# Patient Record
Sex: FEMALE | Race: OTHER | HISPANIC OR LATINO
[De-identification: names, ages, dates, MRNs, and addresses within clinical notes are randomized per-mention and may not be internally consistent; named-entity substitution may affect disease eponyms.]

---

## 2019-10-04 VITALS
SYSTOLIC BLOOD PRESSURE: 127 MMHG | TEMPERATURE: 98 F | RESPIRATION RATE: 16 BRPM | WEIGHT: 161.6 LBS | HEIGHT: 67 IN | OXYGEN SATURATION: 97 % | HEART RATE: 81 BPM | DIASTOLIC BLOOD PRESSURE: 82 MMHG

## 2019-10-07 ENCOUNTER — RESULT REVIEW (OUTPATIENT)
Age: 34
End: 2019-10-07

## 2019-10-07 ENCOUNTER — OUTPATIENT (OUTPATIENT)
Dept: OUTPATIENT SERVICES | Facility: HOSPITAL | Age: 34
LOS: 1 days | Discharge: HOME | End: 2019-10-07
Payer: COMMERCIAL

## 2019-10-07 VITALS — TEMPERATURE: 98 F

## 2019-10-07 DIAGNOSIS — Z98.890 OTHER SPECIFIED POSTPROCEDURAL STATES: Chronic | ICD-10-CM

## 2019-10-07 LAB
GRAM STN FLD: SIGNIFICANT CHANGE UP
SPECIMEN SOURCE: SIGNIFICANT CHANGE UP

## 2019-10-07 PROCEDURE — 88305 TISSUE EXAM BY PATHOLOGIST: CPT

## 2019-10-07 PROCEDURE — 53230 REMOVAL OF URETHRA LESION: CPT

## 2019-10-07 PROCEDURE — 87075 CULTR BACTERIA EXCEPT BLOOD: CPT

## 2019-10-07 PROCEDURE — 87070 CULTURE OTHR SPECIMN AEROBIC: CPT

## 2019-10-07 RX ORDER — OXYBUTYNIN CHLORIDE 5 MG
5 TABLET ORAL ONCE
Refills: 0 | Status: DISCONTINUED | OUTPATIENT
Start: 2019-10-07 | End: 2019-10-07

## 2019-10-07 RX ORDER — SODIUM CHLORIDE 9 MG/ML
1000 INJECTION, SOLUTION INTRAVENOUS
Refills: 0 | Status: DISCONTINUED | OUTPATIENT
Start: 2019-10-07 | End: 2019-10-07

## 2019-10-07 RX ORDER — DOCUSATE SODIUM 100 MG
1 CAPSULE ORAL
Qty: 60 | Refills: 0
Start: 2019-10-07

## 2019-10-07 RX ORDER — ONDANSETRON 8 MG/1
4 TABLET, FILM COATED ORAL EVERY 6 HOURS
Refills: 0 | Status: DISCONTINUED | OUTPATIENT
Start: 2019-10-07 | End: 2019-10-07

## 2019-10-07 RX ORDER — OXYCODONE AND ACETAMINOPHEN 5; 325 MG/1; MG/1
2 TABLET ORAL EVERY 6 HOURS
Refills: 0 | Status: DISCONTINUED | OUTPATIENT
Start: 2019-10-07 | End: 2019-10-07

## 2019-10-07 RX ORDER — OXYCODONE AND ACETAMINOPHEN 5; 325 MG/1; MG/1
1 TABLET ORAL EVERY 4 HOURS
Refills: 0 | Status: DISCONTINUED | OUTPATIENT
Start: 2019-10-07 | End: 2019-10-07

## 2019-10-07 RX ORDER — HYDROMORPHONE HYDROCHLORIDE 2 MG/ML
0.5 INJECTION INTRAMUSCULAR; INTRAVENOUS; SUBCUTANEOUS
Refills: 0 | Status: DISCONTINUED | OUTPATIENT
Start: 2019-10-07 | End: 2019-10-07

## 2019-10-07 RX ORDER — METOCLOPRAMIDE HCL 10 MG
10 TABLET ORAL ONCE
Refills: 0 | Status: DISCONTINUED | OUTPATIENT
Start: 2019-10-07 | End: 2019-10-07

## 2019-10-07 RX ORDER — CEPHALEXIN 500 MG
1 CAPSULE ORAL
Qty: 12 | Refills: 0
Start: 2019-10-07 | End: 2019-10-12

## 2019-10-07 RX ORDER — SCOPALAMINE 1 MG/3D
1 PATCH, EXTENDED RELEASE TRANSDERMAL ONCE
Refills: 0 | Status: COMPLETED | OUTPATIENT
Start: 2019-10-07 | End: 2019-10-07

## 2019-10-07 RX ADMIN — SCOPALAMINE 1 PATCH: 1 PATCH, EXTENDED RELEASE TRANSDERMAL at 10:28

## 2019-10-10 LAB — SURGICAL PATHOLOGY STUDY: SIGNIFICANT CHANGE UP

## 2019-10-12 LAB
CULTURE RESULTS: NO GROWTH — SIGNIFICANT CHANGE UP
SPECIMEN SOURCE: SIGNIFICANT CHANGE UP

## 2019-10-21 ENCOUNTER — APPOINTMENT (OUTPATIENT)
Dept: RADIOLOGY | Facility: HOSPITAL | Age: 34
End: 2019-10-21
Payer: COMMERCIAL

## 2019-10-21 ENCOUNTER — OUTPATIENT (OUTPATIENT)
Dept: OUTPATIENT SERVICES | Facility: HOSPITAL | Age: 34
LOS: 1 days | End: 2019-10-21
Payer: COMMERCIAL

## 2019-10-21 DIAGNOSIS — Z98.890 OTHER SPECIFIED POSTPROCEDURAL STATES: Chronic | ICD-10-CM

## 2019-10-21 PROCEDURE — 74455 X-RAY URETHRA/BLADDER: CPT | Mod: 26

## 2019-10-21 PROCEDURE — 74455 X-RAY URETHRA/BLADDER: CPT

## 2019-10-21 PROCEDURE — 51600 INJECTION FOR BLADDER X-RAY: CPT

## 2019-11-07 ENCOUNTER — RX RENEWAL (OUTPATIENT)
Age: 34
End: 2019-11-07

## 2019-11-07 DIAGNOSIS — Z87.440 PERSONAL HISTORY OF URINARY (TRACT) INFECTIONS: ICD-10-CM

## 2020-01-16 ENCOUNTER — RECORD ABSTRACTING (OUTPATIENT)
Age: 35
End: 2020-01-16

## 2020-01-16 DIAGNOSIS — Z78.9 OTHER SPECIFIED HEALTH STATUS: ICD-10-CM

## 2020-01-16 DIAGNOSIS — Z87.19 PERSONAL HISTORY OF OTHER DISEASES OF THE DIGESTIVE SYSTEM: ICD-10-CM

## 2020-01-16 DIAGNOSIS — N28.89 OTHER SPECIFIED DISORDERS OF KIDNEY AND URETER: ICD-10-CM

## 2020-01-16 DIAGNOSIS — N36.8 OTHER SPECIFIED DISORDERS OF URETHRA: ICD-10-CM

## 2020-01-29 ENCOUNTER — APPOINTMENT (OUTPATIENT)
Dept: UROLOGY | Facility: CLINIC | Age: 35
End: 2020-01-29
Payer: COMMERCIAL

## 2020-01-29 LAB
BILIRUB UR QL STRIP: NORMAL
CLARITY UR: CLEAR
COLLECTION METHOD: NORMAL
GLUCOSE UR-MCNC: NORMAL
HCG UR QL: NORMAL EU/DL
HGB UR QL STRIP.AUTO: NORMAL
KETONES UR-MCNC: NORMAL
LEUKOCYTE ESTERASE UR QL STRIP: NORMAL
NITRITE UR QL STRIP: NORMAL
PH UR STRIP: 7
PROT UR STRIP-MCNC: NORMAL
SP GR UR STRIP: 1.02

## 2020-01-29 PROCEDURE — 99214 OFFICE O/P EST MOD 30 MIN: CPT

## 2020-01-29 PROCEDURE — 81003 URINALYSIS AUTO W/O SCOPE: CPT | Mod: QW

## 2020-01-29 NOTE — HISTORY OF PRESENT ILLNESS
[FreeTextEntry1] : Pt is a 34 year old  s/p urethral diverticulectomy on 10/7/19.  She presents today complaining of "feeling a stitch" in the vagina.  She also has been experiencing lower back and suprapubic pressure for 2 weeks.

## 2020-01-29 NOTE — PHYSICAL EXAM
[General Appearance - Well Developed] : well developed [General Appearance - Well Nourished] : well nourished [Normal Appearance] : normal appearance [Well Groomed] : well groomed [General Appearance - In No Acute Distress] : no acute distress [Abdomen Soft] : soft [Abdomen Tenderness] : non-tender [Costovertebral Angle Tenderness] : no ~M costovertebral angle tenderness [Urethral Meatus] : normal urethra [FreeTextEntry1] : left sub-urethral suture knot with surrounding irritiation- cut at level of skin [Edema] : no peripheral edema [] : no respiratory distress [Respiration, Rhythm And Depth] : normal respiratory rhythm and effort [Exaggerated Use Of Accessory Muscles For Inspiration] : no accessory muscle use [Oriented To Time, Place, And Person] : oriented to person, place, and time [Affect] : the affect was normal [Mood] : the mood was normal [Not Anxious] : not anxious [Normal Station and Gait] : the gait and station were normal for the patient's age [No Focal Deficits] : no focal deficits [No Palpable Adenopathy] : no palpable adenopathy

## 2020-01-29 NOTE — ASSESSMENT
[FreeTextEntry1] : 33 yo female s/p urethral diverticulectomy with a small non-absorbed knot of a vicryl suture- cut in the office which was well -tolerated.  Once removed, vaginal periurethral tissue is soft and no foreign body is palpated.   Surrounding irritation should heal now that nidus removed.  If no better in 2 weeks, I would like to see patient in office for a repeat vaginal exam.

## 2020-01-31 LAB — BACTERIA UR CULT: NORMAL

## 2020-05-14 PROBLEM — K21.9 GASTRO-ESOPHAGEAL REFLUX DISEASE WITHOUT ESOPHAGITIS: Chronic | Status: ACTIVE | Noted: 2019-10-04

## 2020-05-14 PROBLEM — N36.1 URETHRAL DIVERTICULUM: Chronic | Status: ACTIVE | Noted: 2019-10-04

## 2020-05-20 ENCOUNTER — APPOINTMENT (OUTPATIENT)
Dept: UROLOGY | Facility: CLINIC | Age: 35
End: 2020-05-20
Payer: COMMERCIAL

## 2020-05-20 VITALS
TEMPERATURE: 97.8 F | WEIGHT: 156 LBS | OXYGEN SATURATION: 98 % | HEART RATE: 75 BPM | BODY MASS INDEX: 24.48 KG/M2 | SYSTOLIC BLOOD PRESSURE: 124 MMHG | HEIGHT: 67 IN | DIASTOLIC BLOOD PRESSURE: 85 MMHG

## 2020-05-20 DIAGNOSIS — R10.2 PELVIC AND PERINEAL PAIN: ICD-10-CM

## 2020-05-20 LAB
BILIRUB UR QL STRIP: NORMAL
CLARITY UR: CLEAR
COLLECTION METHOD: NORMAL
GLUCOSE UR-MCNC: NORMAL
HCG UR QL: 0.2 EU/DL
HGB UR QL STRIP.AUTO: NORMAL
KETONES UR-MCNC: NORMAL
LEUKOCYTE ESTERASE UR QL STRIP: NORMAL
NITRITE UR QL STRIP: NORMAL
PH UR STRIP: 7
PROT UR STRIP-MCNC: NORMAL
SP GR UR STRIP: 1.01

## 2020-05-20 PROCEDURE — 81003 URINALYSIS AUTO W/O SCOPE: CPT | Mod: QW

## 2020-05-20 PROCEDURE — 99215 OFFICE O/P EST HI 40 MIN: CPT

## 2020-05-20 NOTE — PHYSICAL EXAM
[General Appearance - Well Nourished] : well nourished [General Appearance - Well Developed] : well developed [Normal Appearance] : normal appearance [Well Groomed] : well groomed [General Appearance - In No Acute Distress] : no acute distress [Abdomen Soft] : soft [Abdomen Tenderness] : non-tender [Costovertebral Angle Tenderness] : no ~M costovertebral angle tenderness [FreeTextEntry1] : no hernia.   sub-urethra- small opening, (3-4mm)- able to intubate with small probe.  No discharge.  [Urinary Bladder Findings] : the bladder was normal on palpation [] : no respiratory distress [Edema] : no peripheral edema [Exaggerated Use Of Accessory Muscles For Inspiration] : no accessory muscle use [Oriented To Time, Place, And Person] : oriented to person, place, and time [Respiration, Rhythm And Depth] : normal respiratory rhythm and effort [Affect] : the affect was normal [Mood] : the mood was normal [Normal Station and Gait] : the gait and station were normal for the patient's age [Not Anxious] : not anxious [No Focal Deficits] : no focal deficits [No Palpable Adenopathy] : no palpable adenopathy

## 2020-05-20 NOTE — HISTORY OF PRESENT ILLNESS
[FreeTextEntry1] : Pt is a 34 year old  s/p urethral diverticulectomy on 10/7/19.  She presented in January complaining of "feeling a stitch" in the vagina.  I removed a small piece of residual suture -? dissolvable Vicryl and asked her to revisit me in 3 weeks if no improvement.   She had minimal symptoms and then was delayed in returning for follow-up given COVID19 crisis.  She presents today for evaluation.  \par \par Yung occasionally experiences discomfort near the urethra with sexual intercourse although denies feeling a foreign body since suture excision in January.   She has no symptoms of urinary tract infection.  \par She states her menstrual periods have been painful lately with increased pain in right lower quadrant.  She also states with exercise she feels a rlq "bulge".\par

## 2020-05-20 NOTE — ASSESSMENT
[FreeTextEntry1] : 35 year old female s/p urethral diverticulectomy with retained suture removed 2 months post-op.  Small area of incision never completely closed.  Pt complains of right lower pelvic pain.  Will order repeat MRI and schedule incision revision.

## 2020-05-22 LAB — BACTERIA UR CULT: NORMAL

## 2020-07-31 ENCOUNTER — LABORATORY RESULT (OUTPATIENT)
Age: 35
End: 2020-07-31

## 2020-07-31 ENCOUNTER — OUTPATIENT (OUTPATIENT)
Dept: OUTPATIENT SERVICES | Facility: HOSPITAL | Age: 35
LOS: 1 days | End: 2020-07-31

## 2020-07-31 DIAGNOSIS — Z01.818 ENCOUNTER FOR OTHER PREPROCEDURAL EXAMINATION: ICD-10-CM

## 2020-07-31 DIAGNOSIS — Z98.890 OTHER SPECIFIED POSTPROCEDURAL STATES: Chronic | ICD-10-CM

## 2020-08-01 ENCOUNTER — TRANSCRIPTION ENCOUNTER (OUTPATIENT)
Age: 35
End: 2020-08-01

## 2020-08-02 ENCOUNTER — TRANSCRIPTION ENCOUNTER (OUTPATIENT)
Age: 35
End: 2020-08-02

## 2020-08-03 ENCOUNTER — RESULT REVIEW (OUTPATIENT)
Age: 35
End: 2020-08-03

## 2020-08-03 ENCOUNTER — APPOINTMENT (OUTPATIENT)
Dept: UROLOGY | Facility: HOSPITAL | Age: 35
End: 2020-08-03

## 2020-08-03 ENCOUNTER — OUTPATIENT (OUTPATIENT)
Dept: OUTPATIENT SERVICES | Facility: HOSPITAL | Age: 35
LOS: 1 days | Discharge: HOME | End: 2020-08-03
Payer: COMMERCIAL

## 2020-08-03 DIAGNOSIS — Z98.890 OTHER SPECIFIED POSTPROCEDURAL STATES: Chronic | ICD-10-CM

## 2020-08-03 PROCEDURE — 52000 CYSTOURETHROSCOPY: CPT | Mod: 59

## 2020-08-03 PROCEDURE — 57295 REVISE VAG GRAFT VIA VAGINA: CPT

## 2020-08-03 PROCEDURE — 88305 TISSUE EXAM BY PATHOLOGIST: CPT | Mod: 26

## 2020-08-04 LAB — SURGICAL PATHOLOGY STUDY: SIGNIFICANT CHANGE UP

## 2020-08-12 ENCOUNTER — APPOINTMENT (OUTPATIENT)
Dept: UROLOGY | Facility: CLINIC | Age: 35
End: 2020-08-12
Payer: COMMERCIAL

## 2020-08-12 ENCOUNTER — LABORATORY RESULT (OUTPATIENT)
Age: 35
End: 2020-08-12

## 2020-08-12 VITALS — SYSTOLIC BLOOD PRESSURE: 134 MMHG | DIASTOLIC BLOOD PRESSURE: 77 MMHG | HEART RATE: 78 BPM

## 2020-08-12 PROCEDURE — 99214 OFFICE O/P EST MOD 30 MIN: CPT

## 2020-08-12 NOTE — HISTORY OF PRESENT ILLNESS
[FreeTextEntry1] : Pt is a 34 year old  s/p urethral diverticulectomy on 10/7/19.  She presented in January complaining of "feeling a stitch" in the vagina.  I removed a small piece of residual suture -? dissolvable Vicryl and asked her to revisit me in 3 weeks if no improvement.   She had minimal symptoms and then was delayed in returning for follow-up given COVID19 crisis.  She presented in May and there was an area of vaginal mucosa that had not fully healed.  I performed a vaginal incision revision with excision of granulated tissue and reapproximation on 8/3.  She presents today for post-op check.  \par \par She states she is still fatigued from surgery and complains of vaginal itching, lower back pain and a sensation of being "tilted" with a "heavy head".  \par

## 2020-08-12 NOTE — PHYSICAL EXAM
[General Appearance - Well Developed] : well developed [Normal Appearance] : normal appearance [General Appearance - Well Nourished] : well nourished [General Appearance - In No Acute Distress] : no acute distress [Well Groomed] : well groomed [Costovertebral Angle Tenderness] : no ~M costovertebral angle tenderness [Abdomen Soft] : soft [Abdomen Tenderness] : non-tender [FreeTextEntry1] : suture line intact, healing well.   [Urinary Bladder Findings] : the bladder was normal on palpation [Edema] : no peripheral edema [Exaggerated Use Of Accessory Muscles For Inspiration] : no accessory muscle use [] : no respiratory distress [Respiration, Rhythm And Depth] : normal respiratory rhythm and effort [Oriented To Time, Place, And Person] : oriented to person, place, and time [Affect] : the affect was normal [Not Anxious] : not anxious [Mood] : the mood was normal [No Focal Deficits] : no focal deficits [Normal Station and Gait] : the gait and station were normal for the patient's age [No Palpable Adenopathy] : no palpable adenopathy

## 2020-08-12 NOTE — ASSESSMENT
[FreeTextEntry1] : 35 year old female s/p urethral diverticulectomy with retained suture removed 2 months post-op.  Small area of incision never completely closed so I revised the incision on 8/3/20.   Suture line intact.  She is likely experiencing post-op fatigue from anesthesia, but I sent a urine and vaginal culture to rule out infection.

## 2020-09-09 ENCOUNTER — APPOINTMENT (OUTPATIENT)
Dept: UROLOGY | Facility: CLINIC | Age: 35
End: 2020-09-09
Payer: COMMERCIAL

## 2020-09-09 DIAGNOSIS — Z00.00 ENCOUNTER FOR GENERAL ADULT MEDICAL EXAMINATION W/OUT ABNORMAL FINDINGS: ICD-10-CM

## 2020-09-09 LAB
BILIRUB UR QL STRIP: NORMAL
CLARITY UR: CLEAR
COLLECTION METHOD: NORMAL
GLUCOSE UR-MCNC: NORMAL
HCG UR QL: 0.2 EU/DL
HGB UR QL STRIP.AUTO: NORMAL
KETONES UR-MCNC: NORMAL
LEUKOCYTE ESTERASE UR QL STRIP: NORMAL
NITRITE UR QL STRIP: NORMAL
PH UR STRIP: NORMAL
PROT UR STRIP-MCNC: NORMAL
SP GR UR STRIP: >=1.03

## 2020-09-09 PROCEDURE — 51798 US URINE CAPACITY MEASURE: CPT

## 2020-09-09 PROCEDURE — 99214 OFFICE O/P EST MOD 30 MIN: CPT | Mod: 25

## 2020-09-09 PROCEDURE — 81003 URINALYSIS AUTO W/O SCOPE: CPT | Mod: QW

## 2020-09-10 NOTE — PHYSICAL EXAM
[General Appearance - Well Nourished] : well nourished [General Appearance - Well Developed] : well developed [Normal Appearance] : normal appearance [Well Groomed] : well groomed [General Appearance - In No Acute Distress] : no acute distress [Abdomen Tenderness] : non-tender [Abdomen Soft] : soft [Urinary Bladder Findings] : the bladder was normal on palpation [Costovertebral Angle Tenderness] : no ~M costovertebral angle tenderness [FreeTextEntry1] : suture line well healed.  No mass on bimanual, non-tender, no DIONE on occult stress test, normal appearing urethra   [Edema] : no peripheral edema [Exaggerated Use Of Accessory Muscles For Inspiration] : no accessory muscle use [Respiration, Rhythm And Depth] : normal respiratory rhythm and effort [] : no respiratory distress [Oriented To Time, Place, And Person] : oriented to person, place, and time [Affect] : the affect was normal [Mood] : the mood was normal [Normal Station and Gait] : the gait and station were normal for the patient's age [Not Anxious] : not anxious [No Focal Deficits] : no focal deficits [No Palpable Adenopathy] : no palpable adenopathy

## 2020-09-10 NOTE — HISTORY OF PRESENT ILLNESS
[FreeTextEntry1] : Pt is a 34 year old  s/p urethral diverticulectomy on 10/7/19.  She presented in January complaining of "feeling a stitch" in the vagina.  I removed a small piece of residual suture -? dissolvable Vicryl and asked her to revisit me in 3 weeks if no improvement.   She had minimal symptoms and then was delayed in returning for follow-up given COVID19 crisis.  She presented in May and there was an area of vaginal mucosa that had not fully healed.  I performed a vaginal incision revision with excision of granulated tissue and reapproximation on 8/3/20.  She had a post-op urinary tract infection which I treated.  She presents today for final post-op check.  \par \par She states she is still experienceing right pelvic pain.  She was evaluated by her gyn last week who suggested an MRI.  SHe has not yet scheduled a visit with her PCP for a referral.  She is also bothered by the appearance of her urethra as she feels as though it is "too wide".   She has mild stress incontinence which she had prior to surgery, (see prior chart and note dated 2019).   She is tearful today and appears depressed.   When I asked her about this, she states her recovery and pelvic pain is the reason. \par

## 2020-09-14 LAB — BACTERIA UR CULT: NORMAL

## 2020-12-03 ENCOUNTER — NON-APPOINTMENT (OUTPATIENT)
Age: 35
End: 2020-12-03

## 2020-12-21 PROBLEM — Z87.440 HISTORY OF URINARY TRACT INFECTION: Status: RESOLVED | Noted: 2019-11-07 | Resolved: 2020-12-21

## 2021-01-11 ENCOUNTER — APPOINTMENT (OUTPATIENT)
Dept: UROLOGY | Facility: CLINIC | Age: 36
End: 2021-01-11

## 2021-01-28 ENCOUNTER — APPOINTMENT (OUTPATIENT)
Dept: SURGERY | Facility: CLINIC | Age: 36
End: 2021-01-28
Payer: COMMERCIAL

## 2021-01-28 VITALS
HEIGHT: 67 IN | BODY MASS INDEX: 24.64 KG/M2 | WEIGHT: 157 LBS | DIASTOLIC BLOOD PRESSURE: 72 MMHG | SYSTOLIC BLOOD PRESSURE: 107 MMHG | HEART RATE: 91 BPM

## 2021-01-28 VITALS — TEMPERATURE: 97.3 F

## 2021-01-28 DIAGNOSIS — Z80.0 FAMILY HISTORY OF MALIGNANT NEOPLASM OF DIGESTIVE ORGANS: ICD-10-CM

## 2021-01-28 DIAGNOSIS — Z80.41 FAMILY HISTORY OF MALIGNANT NEOPLASM OF OVARY: ICD-10-CM

## 2021-01-28 DIAGNOSIS — R10.31 RIGHT LOWER QUADRANT PAIN: ICD-10-CM

## 2021-01-28 PROCEDURE — 99072 ADDL SUPL MATRL&STAF TM PHE: CPT

## 2021-01-28 PROCEDURE — 99203 OFFICE O/P NEW LOW 30 MIN: CPT

## 2021-01-28 RX ORDER — CEPHALEXIN 500 MG/1
500 TABLET ORAL 3 TIMES DAILY
Qty: 15 | Refills: 0 | Status: COMPLETED | COMMUNITY
Start: 2019-11-07 | End: 2021-01-28

## 2021-01-28 RX ORDER — OMEPRAZOLE 10 MG/1
10 CAPSULE, DELAYED RELEASE ORAL
Refills: 0 | Status: ACTIVE | COMMUNITY

## 2021-01-28 RX ORDER — NITROFURANTOIN (MONOHYDRATE/MACROCRYSTALS) 25; 75 MG/1; MG/1
100 CAPSULE ORAL
Qty: 10 | Refills: 0 | Status: COMPLETED | COMMUNITY
Start: 2020-08-17 | End: 2021-01-28

## 2021-01-28 RX ORDER — ACETAMINOPHEN 325 MG/1
TABLET, FILM COATED ORAL
Refills: 0 | Status: ACTIVE | COMMUNITY

## 2021-01-28 RX ORDER — MULTIVIT-MIN/IRON FUM/FOLIC AC 7.5 MG-4
TABLET ORAL
Refills: 0 | Status: ACTIVE | COMMUNITY

## 2021-01-28 NOTE — PLAN
[FreeTextEntry1] : Ms. WILEY  is here with right groin pain . she  was told significance of findings, options, risks and benefits were explained.   All surgical options were discussed including non-surgical treatments.   she was advised to take Tylenol for pain and to see pain management specialist and return if needed. \par Patient advised to seek immediate medical attention with any acute change in symptoms or with the development of any new or worsening symptoms.  Patient's questions and concerns addressed to patient's satisfaction, and patient verbalized an understanding of the information discussed.\par \par

## 2021-01-28 NOTE — PHYSICAL EXAM
[Alert] : alert [Oriented to Person] : oriented to person [Oriented to Place] : oriented to place [Oriented to Time] : oriented to time [Calm] : calm [Abdominal Masses] : No abdominal masses [Abdomen Tenderness] : ~T ~M No abdominal tenderness [de-identified] : She  is alert, well-groomed, and cheerful.\par   [de-identified] : anicteric.  Nasal mucosa pink, septum midline. Oral mucosa pink.  Tongue midline, Pharynx without exudates.\par   [de-identified] : Neck supple. Trachea midline. Thyroid isthmus barely palpable, lobes not felt.\par   [de-identified] :     No evidence of hernia

## 2021-01-28 NOTE — HISTORY OF PRESENT ILLNESS
[de-identified] : Ms. CHICO WILEY is  a 35 year old female presenting  with the chief complaint of having pain   in her Right  groin.  She has had the condition since August 2020 and is worse when she is  exercising or having bowel movement. she also states that the pain is worse when she has her monthly periods. She also reports back pain.   She denies any fever or  night sweats. Appetite is good and weight is stable.  she reports Urethral surgery due to the diverticulum in her urethra. She had MRI of the pelvis on 11/06/2020 did not show evidence of hernia. \par  \par

## 2021-12-30 NOTE — ASSESSMENT
[FreeTextEntry1] : 36 yo old s/p urethral diverticulectomy and peristent right lower quadrant of unclear etiology.  I have explained this was far removed from the incision revision site and I do not think it is connected to her surgery with me.  Nonetheless, I have ordered a pelvic MRI.  If this is normal, I suggest she see a pelvic floor PT as this may relieve some residual pelvic pain.  I sent today's urine for culture.  
[UNKNOWN]
